# Patient Record
Sex: MALE | Race: ASIAN | URBAN - METROPOLITAN AREA
[De-identification: names, ages, dates, MRNs, and addresses within clinical notes are randomized per-mention and may not be internally consistent; named-entity substitution may affect disease eponyms.]

---

## 2023-04-03 ENCOUNTER — PROCEDURE (OUTPATIENT)
Facility: LOCATION | Age: 33
End: 2023-04-03
Payer: COMMERCIAL

## 2023-04-03 DIAGNOSIS — H16.223 KERATOCONJUNCT SICCA, NOT SPECIFIED AS SJOGREN'S, BILATERAL: ICD-10-CM

## 2023-04-03 DIAGNOSIS — H18.613 KERATOCONUS, STABLE, BILATERAL: ICD-10-CM

## 2023-04-03 DIAGNOSIS — H04.123 DRY EYE SYNDROME OF BILATERAL LACRIMAL GLANDS: Primary | ICD-10-CM

## 2023-04-03 DIAGNOSIS — H40.013 OPEN ANGLE WITH BORDERLINE FINDINGS, LOW RISK, BILATERAL: ICD-10-CM

## 2023-04-03 RX ORDER — OMEGA-3/DHA/EPA/FISH OIL 1000 MG
CAPSULE ORAL
Qty: 0 | Refills: 0 | Status: ACTIVE
Start: 2023-04-03

## 2023-04-03 RX ORDER — ERYTHROMYCIN 5 MG/G
OINTMENT OPHTHALMIC
Qty: 0 | Refills: 0 | Status: ACTIVE
Start: 2023-04-03

## 2023-04-03 ASSESSMENT — INTRAOCULAR PRESSURE
OD: 13
OS: 12

## 2023-04-03 NOTE — IMPRESSION/PLAN
Impression: Examination revealed dry eye syndrome secondary to tear deficiencies. Plan: Silicon x4 placed today w/o complications. Patient tolerated and was consented. Lipiview ordered, performed and reviewed with patient. Patient reminded to not rub inner corners of eyes to avoid agitating plugs. Explained to patient that inner corners may feel sore after procedure and should feel better as area heals in a few days. Recommend cold compress for itching. RTC if any pain occurs. Patient expressed understanding. Patient to continue the use of Refresh SEBAS 3 TIDOU, and Omega 3's, and hot compresses BID OU Emphasized importance of starting and maintaining dry eye treatment. RTC as scheduled on 4/17/2023 for evaluation with Dr. Sanchez Johnson.

## 2023-04-03 NOTE — IMPRESSION/PLAN
Impression: Keratoconjunct sicca, not specified as Sjogren's, bilateral: T50.817. Plan: Refer to plan 1.

## 2023-04-03 NOTE — IMPRESSION/PLAN
Impression: Examination revealed the patient is at risk for glaucoma based on several risk factors. Glaucoma suspect information handout given to patient. Plan: RTC as scheduled on 4/17/2023 for evaluation with Dr. Andre Royal, VF Soraya Fast, OCT ONH, and gonioscopy this visit.

## 2023-04-03 NOTE — IMPRESSION/PLAN
Impression: Examination revealed keratoconus. Previously :OD has increased from 3.64 to 4.38. Plan: No CXL indicated at present until further progression noted. RTC in 6 weeks for re-evaluation with Dr. Britt Ac.

## 2023-04-17 ENCOUNTER — OFFICE VISIT (OUTPATIENT)
Facility: LOCATION | Age: 33
End: 2023-04-17
Payer: COMMERCIAL

## 2023-04-17 DIAGNOSIS — H04.123 DRY EYE SYNDROME OF BILATERAL LACRIMAL GLANDS: Primary | ICD-10-CM

## 2023-04-17 DIAGNOSIS — H18.613 KERATOCONUS, STABLE, BILATERAL: ICD-10-CM

## 2023-04-17 DIAGNOSIS — H16.223 KERATOCONJUNCT SICCA, NOT SPECIFIED AS SJOGREN'S, BILATERAL: ICD-10-CM

## 2023-04-17 DIAGNOSIS — H40.013 OPEN ANGLE WITH BORDERLINE FINDINGS, LOW RISK, BILATERAL: ICD-10-CM

## 2023-04-17 PROCEDURE — 99214 OFFICE O/P EST MOD 30 MIN: CPT | Performed by: STUDENT IN AN ORGANIZED HEALTH CARE EDUCATION/TRAINING PROGRAM

## 2023-04-17 PROCEDURE — 92133 CPTRZD OPH DX IMG PST SGM ON: CPT | Performed by: STUDENT IN AN ORGANIZED HEALTH CARE EDUCATION/TRAINING PROGRAM

## 2023-04-17 PROCEDURE — 92083 EXTENDED VISUAL FIELD XM: CPT | Performed by: STUDENT IN AN ORGANIZED HEALTH CARE EDUCATION/TRAINING PROGRAM

## 2023-04-17 PROCEDURE — 92020 GONIOSCOPY: CPT | Performed by: STUDENT IN AN ORGANIZED HEALTH CARE EDUCATION/TRAINING PROGRAM

## 2023-04-17 ASSESSMENT — INTRAOCULAR PRESSURE
OD: 8
OS: 10
OS: 8

## 2023-04-17 NOTE — IMPRESSION/PLAN
Impression: Examination revealed the patient is at risk for glaucoma based on several risk factors. CC: Patient presents today for evaluation of glaucoma. POHx: FOHx: PMHx:
SocialHx:
Eye medications: Allergies: NKDA Plan: Testing: 
OCT/ONH 04/2023: 
HVF 24-2 04/2023:
Pachy: pending Gonio 04/2023:  OU Tmax: unknown / pending Target IOP: pending Today: 	 IOP acceptable Testing performed and reviewed today Informed patient testing and examination today revealed no concerning signs toward glaucoma OD but possible progression OS. Emphasized the importance of additional testing to determine if there is glaucoma damage/progression. Informed patient given his excellent IOP we have low suspicions of glaucoma. Discussed different possible treatment options. Patient expressed understanding. Educated patient on the diagnosis and physiological development of glaucoma in great detail. Explained to patient of glaucoma being high eye pressure, having peripheral vision loss and enlarged/damaged optic nerves. Informed patient high eye pressure can cause damage to the optic nerves therefore leading to irreversible vision loss. Educated the patient that glaucoma is a chronic disease and there is no cure but it can be treated and controlled. Discussed different treatment options being medications, laser vs surgery. ________________________________________ No treatment indicated at this time.

## 2023-04-17 NOTE — IMPRESSION/PLAN
Impression: Examination revealed keratoconus. Previously :OD has increased from 3.64 to 4.38. Plan: No CXL indicated at present until further progression noted. RTC in 6 weeks for re-evaluation with Dr. Cecile Pop.

## 2023-04-17 NOTE — IMPRESSION/PLAN
Impression: Keratoconjunct sicca, not specified as Sjogren's, bilateral: W40.829. Plan: Refer to plan 1.

## 2023-05-10 ENCOUNTER — OFFICE VISIT (OUTPATIENT)
Facility: LOCATION | Age: 33
End: 2023-05-10
Payer: COMMERCIAL

## 2023-05-10 DIAGNOSIS — H16.223 KERATOCONJUNCT SICCA, NOT SPECIFIED AS SJOGREN'S, BILATERAL: ICD-10-CM

## 2023-05-10 DIAGNOSIS — H17.89 OTHER CORNEAL SCARS AND OPACITIES: ICD-10-CM

## 2023-05-10 DIAGNOSIS — H18.613 KERATOCONUS, STABLE, BILATERAL: Primary | ICD-10-CM

## 2023-05-10 DIAGNOSIS — H04.123 DRY EYE SYNDROME OF BILATERAL LACRIMAL GLANDS: ICD-10-CM

## 2023-05-10 PROCEDURE — 99213 OFFICE O/P EST LOW 20 MIN: CPT | Performed by: OPHTHALMOLOGY

## 2023-05-10 PROCEDURE — 92025 CPTRIZED CORNEAL TOPOGRAPHY: CPT | Performed by: OPHTHALMOLOGY

## 2023-05-10 ASSESSMENT — INTRAOCULAR PRESSURE
OD: 11
OS: 11

## 2023-05-10 NOTE — IMPRESSION/PLAN
Impression: Keratoconjunct sicca, not specified as Sjogren's, bilateral: W05.509. Plan: Same as above.

## 2023-05-10 NOTE — IMPRESSION/PLAN
Impression: Keratoconus, stable, bilateral: H18.613. OS > OD  Plan: TMS and Pentacam ordered today. Reviewed topography results with pt which show slight worsening OU. No CXL at present until pt is ready to proceed. Patient to call to schedule for a CXL consult with Renetta Peña. RTC in 1 year for re-evaluation with Dr. Lee Ann Quinones. Repeat TMS, Pentacam, and OCT Pachys. RTC prn if worsening of condition noted.

## 2023-05-10 NOTE — IMPRESSION/PLAN
Impression: Dry eye syndrome of bilateral lacrimal glands: H04.123. Plan: Continue Systane omega 3s Q4HR OU, Hot compresses QHS OU, and omega 3s PO.

## 2024-01-12 ENCOUNTER — OFFICE VISIT (OUTPATIENT)
Facility: LOCATION | Age: 34
End: 2024-01-12
Payer: COMMERCIAL

## 2024-01-12 DIAGNOSIS — H40.013 OPEN ANGLE WITH BORDERLINE FINDINGS, LOW RISK, BILATERAL: Primary | ICD-10-CM

## 2024-01-12 DIAGNOSIS — H04.123 DRY EYE SYNDROME OF BILATERAL LACRIMAL GLANDS: ICD-10-CM

## 2024-01-12 DIAGNOSIS — H16.223 KERATOCONJUNCT SICCA, NOT SPECIFIED AS SJOGREN'S, BILATERAL: ICD-10-CM

## 2024-01-12 DIAGNOSIS — H17.89 OTHER CORNEAL SCARS AND OPACITIES: ICD-10-CM

## 2024-01-12 DIAGNOSIS — H18.613 KERATOCONUS, STABLE, BILATERAL: ICD-10-CM

## 2024-01-12 PROCEDURE — 76514 ECHO EXAM OF EYE THICKNESS: CPT | Performed by: STUDENT IN AN ORGANIZED HEALTH CARE EDUCATION/TRAINING PROGRAM

## 2024-01-12 PROCEDURE — 92133 CPTRZD OPH DX IMG PST SGM ON: CPT | Performed by: STUDENT IN AN ORGANIZED HEALTH CARE EDUCATION/TRAINING PROGRAM

## 2024-01-12 PROCEDURE — 99214 OFFICE O/P EST MOD 30 MIN: CPT | Performed by: STUDENT IN AN ORGANIZED HEALTH CARE EDUCATION/TRAINING PROGRAM

## 2024-01-12 ASSESSMENT — INTRAOCULAR PRESSURE
OS: 9
OD: 10
OD: 9
OS: 10

## 2024-05-15 ENCOUNTER — OFFICE VISIT (OUTPATIENT)
Facility: LOCATION | Age: 34
End: 2024-05-15
Payer: COMMERCIAL

## 2024-05-15 DIAGNOSIS — H04.123 DRY EYE SYNDROME OF BILATERAL LACRIMAL GLANDS: ICD-10-CM

## 2024-05-15 DIAGNOSIS — H40.013 OPEN ANGLE WITH BORDERLINE FINDINGS, LOW RISK, BILATERAL: ICD-10-CM

## 2024-05-15 DIAGNOSIS — H18.623 KERATOCONUS, UNSTABLE, BILATERAL: Primary | ICD-10-CM

## 2024-05-15 DIAGNOSIS — H16.223 KERATOCONJUNCT SICCA, NOT SPECIFIED AS SJOGREN'S, BILATERAL: ICD-10-CM

## 2024-05-15 PROCEDURE — 99214 OFFICE O/P EST MOD 30 MIN: CPT | Performed by: OPHTHALMOLOGY

## 2024-05-15 PROCEDURE — 76514 ECHO EXAM OF EYE THICKNESS: CPT | Performed by: OPHTHALMOLOGY

## 2024-05-15 PROCEDURE — 92025 CPTRIZED CORNEAL TOPOGRAPHY: CPT | Performed by: OPHTHALMOLOGY

## 2024-05-15 RX ORDER — ERYTHROMYCIN 5 MG/G
OINTMENT OPHTHALMIC
Qty: 3.5 | Refills: 11 | Status: ACTIVE
Start: 2024-05-15

## 2024-05-15 ASSESSMENT — INTRAOCULAR PRESSURE
OD: 8
OS: 8

## 2024-05-15 ASSESSMENT — KERATOMETRY
OD: 62.75
OS: 59.06

## 2024-06-21 ENCOUNTER — OFFICE VISIT (OUTPATIENT)
Facility: LOCATION | Age: 34
End: 2024-06-21
Payer: COMMERCIAL

## 2024-06-21 DIAGNOSIS — H04.123 DRY EYE SYNDROME OF BILATERAL LACRIMAL GLANDS: Primary | ICD-10-CM

## 2024-06-21 DIAGNOSIS — H16.223 KERATOCONJUNCT SICCA, NOT SPECIFIED AS SJOGREN'S, BILATERAL: ICD-10-CM

## 2024-06-21 DIAGNOSIS — H18.623 KERATOCONUS, UNSTABLE, BILATERAL: ICD-10-CM

## 2024-08-02 ENCOUNTER — OFFICE VISIT (OUTPATIENT)
Facility: LOCATION | Age: 34
End: 2024-08-02
Payer: COMMERCIAL

## 2024-08-02 DIAGNOSIS — H04.123 DRY EYE SYNDROME OF BILATERAL LACRIMAL GLANDS: ICD-10-CM

## 2024-08-02 DIAGNOSIS — H18.623 KERATOCONUS, UNSTABLE, BILATERAL: Primary | ICD-10-CM

## 2024-08-02 DIAGNOSIS — H16.223 KERATOCONJUNCT SICCA, NOT SPECIFIED AS SJOGREN'S, BILATERAL: ICD-10-CM

## 2024-08-02 PROCEDURE — 99213 OFFICE O/P EST LOW 20 MIN: CPT | Performed by: OPHTHALMOLOGY

## 2024-08-02 PROCEDURE — 92025 CPTRIZED CORNEAL TOPOGRAPHY: CPT | Performed by: OPHTHALMOLOGY

## 2024-08-02 RX ORDER — FLUOROMETHOLONE 1 MG/ML
0.1 % SOLUTION/ DROPS OPHTHALMIC
Qty: 5 | Refills: 4 | Status: ACTIVE
Start: 2024-08-02

## 2024-08-02 RX ORDER — SODIUM CHLORIDE 50 MG/ML
5 % SOLUTION OPHTHALMIC
Qty: 15 | Refills: 0 | Status: ACTIVE
Start: 2024-08-02

## 2024-08-02 RX ORDER — BROMFENAC SODIUM 0.7 MG/ML
0.07 % SOLUTION/ DROPS OPHTHALMIC
Qty: 3 | Refills: 3 | Status: ACTIVE
Start: 2024-08-02

## 2024-08-02 RX ORDER — CYCLOSPORINE OPHTHALMIC SOLUTION 1 MG/ML
0.1 % SOLUTION/ DROPS OPHTHALMIC
Qty: 2 | Refills: 10 | Status: ACTIVE
Start: 2024-08-02

## 2024-08-02 RX ORDER — MOXIFLOXACIN 5 MG/ML
0.5 % SOLUTION/ DROPS OPHTHALMIC
Qty: 3 | Refills: 3 | Status: ACTIVE
Start: 2024-08-02

## 2024-08-16 ENCOUNTER — OFFICE VISIT (OUTPATIENT)
Facility: LOCATION | Age: 34
End: 2024-08-16
Payer: COMMERCIAL

## 2024-08-16 DIAGNOSIS — H04.123 DRY EYE SYNDROME OF BILATERAL LACRIMAL GLANDS: Primary | ICD-10-CM

## 2024-08-16 PROCEDURE — 68761 CLOSE TEAR DUCT OPENING: CPT | Performed by: OPTOMETRIST

## 2024-08-30 ENCOUNTER — OFFICE VISIT (OUTPATIENT)
Facility: LOCATION | Age: 34
End: 2024-08-30
Payer: COMMERCIAL

## 2024-08-30 DIAGNOSIS — H18.623 KERATOCONUS, UNSTABLE, BILATERAL: Primary | ICD-10-CM

## 2024-08-30 DIAGNOSIS — H04.123 DRY EYE SYNDROME OF BILATERAL LACRIMAL GLANDS: ICD-10-CM

## 2024-08-30 DIAGNOSIS — H16.223 KERATOCONJUNCT SICCA, NOT SPECIFIED AS SJOGREN'S, BILATERAL: ICD-10-CM

## 2024-08-30 PROCEDURE — 92025 CPTRIZED CORNEAL TOPOGRAPHY: CPT | Performed by: OPHTHALMOLOGY

## 2024-08-30 PROCEDURE — 99213 OFFICE O/P EST LOW 20 MIN: CPT | Performed by: OPHTHALMOLOGY

## 2024-09-18 ENCOUNTER — TECH ONLY (OUTPATIENT)
Facility: LOCATION | Age: 34
End: 2024-09-18
Payer: COMMERCIAL

## 2024-09-18 DIAGNOSIS — H18.622 KERATOCONUS, UNSTABLE, LEFT EYE: Primary | ICD-10-CM

## 2024-09-18 PROCEDURE — 0402T COLGN CRS-LINK CRN&PACHYMTRY: CPT | Performed by: OPHTHALMOLOGY

## 2024-09-19 ENCOUNTER — OFFICE VISIT (OUTPATIENT)
Facility: LOCATION | Age: 34
End: 2024-09-19
Payer: COMMERCIAL

## 2024-09-19 DIAGNOSIS — H18.612 KERATOCONUS, STABLE, LEFT EYE: Primary | ICD-10-CM

## 2024-09-19 PROCEDURE — 99213 OFFICE O/P EST LOW 20 MIN: CPT | Performed by: OPTOMETRIST

## 2024-09-23 ENCOUNTER — OFFICE VISIT (OUTPATIENT)
Facility: LOCATION | Age: 34
End: 2024-09-23
Payer: COMMERCIAL

## 2024-09-23 DIAGNOSIS — H18.612 KERATOCONUS, STABLE, LEFT EYE: Primary | ICD-10-CM

## 2024-09-23 PROCEDURE — 99213 OFFICE O/P EST LOW 20 MIN: CPT | Performed by: OPTOMETRIST

## 2024-10-25 ENCOUNTER — OFFICE VISIT (OUTPATIENT)
Facility: LOCATION | Age: 34
End: 2024-10-25
Payer: COMMERCIAL

## 2024-10-25 DIAGNOSIS — H18.612 KERATOCONUS, STABLE, LEFT EYE: Primary | ICD-10-CM

## 2024-10-25 DIAGNOSIS — H18.621 KERATOCONUS, UNSTABLE, RIGHT EYE: ICD-10-CM

## 2024-10-25 PROCEDURE — 99213 OFFICE O/P EST LOW 20 MIN: CPT | Performed by: OPTOMETRIST

## 2024-10-25 ASSESSMENT — VISUAL ACUITY
OS: 20/300
OD: 20/350

## 2024-10-25 ASSESSMENT — INTRAOCULAR PRESSURE
OS: 8
OD: 6

## 2024-10-25 ASSESSMENT — KERATOMETRY
OD: 63.75
OS: 64.69

## 2025-01-24 ENCOUNTER — OFFICE VISIT (OUTPATIENT)
Facility: LOCATION | Age: 35
End: 2025-01-24
Payer: COMMERCIAL

## 2025-01-24 DIAGNOSIS — H18.612 KERATOCONUS, STABLE, LEFT EYE: Primary | ICD-10-CM

## 2025-01-24 DIAGNOSIS — H18.621 KERATOCONUS, UNSTABLE, RIGHT EYE: ICD-10-CM

## 2025-01-24 PROCEDURE — 92025 CPTRIZED CORNEAL TOPOGRAPHY: CPT | Performed by: OPTOMETRIST

## 2025-01-24 PROCEDURE — 76514 ECHO EXAM OF EYE THICKNESS: CPT | Performed by: OPTOMETRIST

## 2025-01-24 PROCEDURE — 99214 OFFICE O/P EST MOD 30 MIN: CPT | Performed by: OPTOMETRIST

## 2025-01-24 ASSESSMENT — VISUAL ACUITY
OS: 20/300
OD: 20/200

## 2025-01-24 ASSESSMENT — INTRAOCULAR PRESSURE
OS: 10
OD: 10

## 2025-04-24 ENCOUNTER — OFFICE VISIT (OUTPATIENT)
Facility: LOCATION | Age: 35
End: 2025-04-24
Payer: COMMERCIAL

## 2025-04-24 DIAGNOSIS — H18.612 KERATOCONUS, STABLE, LEFT EYE: Primary | ICD-10-CM

## 2025-04-24 DIAGNOSIS — H18.621 KERATOCONUS, UNSTABLE, RIGHT EYE: ICD-10-CM

## 2025-04-24 PROCEDURE — 99213 OFFICE O/P EST LOW 20 MIN: CPT | Performed by: OPTOMETRIST

## 2025-04-24 PROCEDURE — 76514 ECHO EXAM OF EYE THICKNESS: CPT | Performed by: OPTOMETRIST

## 2025-04-24 PROCEDURE — 92025 CPTRIZED CORNEAL TOPOGRAPHY: CPT | Performed by: OPTOMETRIST

## 2025-04-24 ASSESSMENT — INTRAOCULAR PRESSURE
OS: 17
OD: 10

## 2025-04-24 ASSESSMENT — VISUAL ACUITY
OD: 20/400
OS: 20/400